# Patient Record
Sex: FEMALE | Race: WHITE | HISPANIC OR LATINO | URBAN - METROPOLITAN AREA
[De-identification: names, ages, dates, MRNs, and addresses within clinical notes are randomized per-mention and may not be internally consistent; named-entity substitution may affect disease eponyms.]

---

## 2017-12-28 ENCOUNTER — EMERGENCY (EMERGENCY)
Facility: HOSPITAL | Age: 37
LOS: 1 days | Discharge: HOME | End: 2017-12-28
Attending: EMERGENCY MEDICINE | Admitting: EMERGENCY MEDICINE
Payer: MEDICAID

## 2017-12-28 VITALS
HEIGHT: 63 IN | WEIGHT: 149.91 LBS | TEMPERATURE: 98 F | OXYGEN SATURATION: 99 % | RESPIRATION RATE: 20 BRPM | DIASTOLIC BLOOD PRESSURE: 84 MMHG | HEART RATE: 78 BPM | SYSTOLIC BLOOD PRESSURE: 129 MMHG

## 2017-12-28 VITALS
HEART RATE: 62 BPM | OXYGEN SATURATION: 99 % | DIASTOLIC BLOOD PRESSURE: 96 MMHG | TEMPERATURE: 97 F | SYSTOLIC BLOOD PRESSURE: 141 MMHG | RESPIRATION RATE: 18 BRPM

## 2017-12-28 LAB
ANION GAP SERPL CALC-SCNC: 10 MMOL/L — SIGNIFICANT CHANGE UP (ref 5–17)
APPEARANCE UR: CLEAR — SIGNIFICANT CHANGE UP
BACTERIA # UR AUTO: ABNORMAL
BASOPHILS # BLD AUTO: 0 K/UL — SIGNIFICANT CHANGE UP (ref 0–0.2)
BASOPHILS NFR BLD AUTO: 0.2 % — SIGNIFICANT CHANGE UP (ref 0–2)
BILIRUB UR-MCNC: NEGATIVE — SIGNIFICANT CHANGE UP
BUN SERPL-MCNC: 11 MG/DL — SIGNIFICANT CHANGE UP (ref 8–20)
CALCIUM SERPL-MCNC: 8.8 MG/DL — SIGNIFICANT CHANGE UP (ref 8.6–10.2)
CHLORIDE SERPL-SCNC: 102 MMOL/L — SIGNIFICANT CHANGE UP (ref 98–107)
CO2 SERPL-SCNC: 24 MMOL/L — SIGNIFICANT CHANGE UP (ref 22–29)
COLOR SPEC: YELLOW — SIGNIFICANT CHANGE UP
CREAT SERPL-MCNC: 0.49 MG/DL — LOW (ref 0.5–1.3)
DIFF PNL FLD: ABNORMAL
EOSINOPHIL # BLD AUTO: 0.1 K/UL — SIGNIFICANT CHANGE UP (ref 0–0.5)
EOSINOPHIL NFR BLD AUTO: 2.5 % — SIGNIFICANT CHANGE UP (ref 0–6)
EPI CELLS # UR: SIGNIFICANT CHANGE UP
GLUCOSE SERPL-MCNC: 101 MG/DL — SIGNIFICANT CHANGE UP (ref 70–115)
GLUCOSE UR QL: NEGATIVE MG/DL — SIGNIFICANT CHANGE UP
HCG SERPL-ACNC: <5 MIU/ML — SIGNIFICANT CHANGE UP
HCT VFR BLD CALC: 40.3 % — SIGNIFICANT CHANGE UP (ref 37–47)
HGB BLD-MCNC: 13.6 G/DL — SIGNIFICANT CHANGE UP (ref 12–16)
KETONES UR-MCNC: NEGATIVE — SIGNIFICANT CHANGE UP
LEUKOCYTE ESTERASE UR-ACNC: ABNORMAL
LYMPHOCYTES # BLD AUTO: 2 K/UL — SIGNIFICANT CHANGE UP (ref 1–4.8)
LYMPHOCYTES # BLD AUTO: 36.8 % — SIGNIFICANT CHANGE UP (ref 20–55)
MCHC RBC-ENTMCNC: 31.4 PG — HIGH (ref 27–31)
MCHC RBC-ENTMCNC: 33.7 G/DL — SIGNIFICANT CHANGE UP (ref 32–36)
MCV RBC AUTO: 93.1 FL — SIGNIFICANT CHANGE UP (ref 81–99)
MONOCYTES # BLD AUTO: 0.5 K/UL — SIGNIFICANT CHANGE UP (ref 0–0.8)
MONOCYTES NFR BLD AUTO: 9.6 % — SIGNIFICANT CHANGE UP (ref 3–10)
NEUTROPHILS # BLD AUTO: 2.8 K/UL — SIGNIFICANT CHANGE UP (ref 1.8–8)
NEUTROPHILS NFR BLD AUTO: 50.7 % — SIGNIFICANT CHANGE UP (ref 37–73)
NITRITE UR-MCNC: NEGATIVE — SIGNIFICANT CHANGE UP
PH UR: 6 — SIGNIFICANT CHANGE UP (ref 5–8)
PLATELET # BLD AUTO: 219 K/UL — SIGNIFICANT CHANGE UP (ref 150–400)
POTASSIUM SERPL-MCNC: 4.3 MMOL/L — SIGNIFICANT CHANGE UP (ref 3.5–5.3)
POTASSIUM SERPL-SCNC: 4.3 MMOL/L — SIGNIFICANT CHANGE UP (ref 3.5–5.3)
PROT UR-MCNC: NEGATIVE MG/DL — SIGNIFICANT CHANGE UP
RBC # BLD: 4.33 M/UL — LOW (ref 4.4–5.2)
RBC # FLD: 12 % — SIGNIFICANT CHANGE UP (ref 11–15.6)
RBC CASTS # UR COMP ASSIST: ABNORMAL /HPF (ref 0–4)
SODIUM SERPL-SCNC: 136 MMOL/L — SIGNIFICANT CHANGE UP (ref 135–145)
SP GR SPEC: 1.01 — SIGNIFICANT CHANGE UP (ref 1.01–1.02)
UROBILINOGEN FLD QL: NEGATIVE MG/DL — SIGNIFICANT CHANGE UP
WBC # BLD: 5.5 K/UL — SIGNIFICANT CHANGE UP (ref 4.8–10.8)
WBC # FLD AUTO: 5.5 K/UL — SIGNIFICANT CHANGE UP (ref 4.8–10.8)
WBC UR QL: SIGNIFICANT CHANGE UP

## 2017-12-28 PROCEDURE — 99284 EMERGENCY DEPT VISIT MOD MDM: CPT

## 2017-12-28 RX ORDER — MECLIZINE HCL 12.5 MG
25 TABLET ORAL ONCE
Refills: 0 | Status: DISCONTINUED | OUTPATIENT
Start: 2017-12-28 | End: 2017-12-28

## 2017-12-28 RX ORDER — SODIUM CHLORIDE 9 MG/ML
1000 INJECTION INTRAMUSCULAR; INTRAVENOUS; SUBCUTANEOUS ONCE
Refills: 0 | Status: COMPLETED | OUTPATIENT
Start: 2017-12-28 | End: 2017-12-28

## 2017-12-28 RX ORDER — MOMETASONE FUROATE 50 UG/1
2 SPRAY NASAL
Qty: 1 | Refills: 0
Start: 2017-12-28 | End: 2018-01-26

## 2017-12-28 RX ORDER — ONDANSETRON 8 MG/1
4 TABLET, FILM COATED ORAL ONCE
Refills: 0 | Status: COMPLETED | OUTPATIENT
Start: 2017-12-28 | End: 2017-12-28

## 2017-12-28 RX ORDER — MECLIZINE HCL 12.5 MG
25 TABLET ORAL ONCE
Refills: 0 | Status: COMPLETED | OUTPATIENT
Start: 2017-12-28 | End: 2017-12-28

## 2017-12-28 RX ORDER — CETIRIZINE HYDROCHLORIDE, PSEUDOEPHEDRINE HYDROCHLORIDE 5; 120 MG/1; MG/1
1 TABLET, FILM COATED, EXTENDED RELEASE ORAL
Qty: 7 | Refills: 0
Start: 2017-12-28 | End: 2018-01-03

## 2017-12-28 RX ORDER — AZITHROMYCIN 500 MG/1
1 TABLET, FILM COATED ORAL
Qty: 4 | Refills: 0
Start: 2017-12-28 | End: 2017-12-31

## 2017-12-28 RX ADMIN — SODIUM CHLORIDE 1000 MILLILITER(S): 9 INJECTION INTRAMUSCULAR; INTRAVENOUS; SUBCUTANEOUS at 11:31

## 2017-12-28 RX ADMIN — Medication 25 MILLIGRAM(S): at 11:31

## 2017-12-28 RX ADMIN — ONDANSETRON 4 MILLIGRAM(S): 8 TABLET, FILM COATED ORAL at 11:31

## 2017-12-28 NOTE — ED STATDOCS - ATTENDING CONTRIBUTION TO CARE
I, Ayala Garvey, performed the initial face to face bedside interview with this patient regarding history of present illness, review of symptoms and relevant past medical, social and family history.  I completed an independent physical examination.  I was the initial provider who evaluated this patient. I have signed out the follow up of any pending tests (i.e. labs, radiological studies) to the ACP.  I have communicated the patient’s plan of care and disposition with the ACP.

## 2017-12-28 NOTE — ED STATDOCS - SECONDARY DIAGNOSIS.
Acute serous otitis media of left ear, recurrence not specified Other nonsuppurative otitis media of right ear, unspecified chronicity

## 2017-12-28 NOTE — ED STATDOCS - OBJECTIVE STATEMENT
36 y/o F pt presents to ED c/o dizziness worsening since last night. Dizziness waxing and waning for 2 months. Pt states she felt her bed and floor moving. +nausea, chronic neck and back pain. Pain rated 4/10. Recent sinus infection. Smoker. Denies sinus congestion. No further complaints at this time.

## 2017-12-28 NOTE — ED ADULT TRIAGE NOTE - CHIEF COMPLAINT QUOTE
Pt states she woke up in bed last night and felt like the bed was moving   This morning she is feeling dizziness when she walks and nausea.

## 2017-12-28 NOTE — ED STATDOCS - MEDICAL DECISION MAKING DETAILS
pt with vertigo will hydrate, give Zofran, meclizine, and D/C pt with vertigo and serous otitis media. will hydrate, give Zofran, meclizine, and D/C

## 2017-12-28 NOTE — ED STATDOCS - PROGRESS NOTE DETAILS
patient re-evaluated, feels better, c/o dizziness intermittently x 2 months, comes and goes, last night worsened, reports is under a lot of stress, denies any suicide idealations or wanting to harm others, PE: general: patient crying, ambulating with no gait abnormalities, chest CTAB, heart s1s2, abd soft NT ND, Ear: R TM erythematous, L TM with fluid, Impression: dizziness with vertigo and ROM/serous LOM. Will RX Nasal spray and decongestant, to f/u with ENT/neuro as outpatient, offered CT of head, patient prefers to f/u as outpatient and offered SW/case management to discuss stressors in life (patient crying at bedside) and refused. offered CT and patient refused

## 2017-12-28 NOTE — ED STATDOCS - CARE PLAN
Principal Discharge DX:	Vertigo  Secondary Diagnosis:	Acute serous otitis media of left ear, recurrence not specified  Secondary Diagnosis:	Other nonsuppurative otitis media of right ear, unspecified chronicity

## 2018-04-02 ENCOUNTER — APPOINTMENT (OUTPATIENT)
Dept: OBGYN | Facility: CLINIC | Age: 38
End: 2018-04-02

## 2021-03-19 ENCOUNTER — APPOINTMENT (OUTPATIENT)
Dept: OBGYN | Facility: CLINIC | Age: 41
End: 2021-03-19

## 2021-03-25 ENCOUNTER — TRANSCRIPTION ENCOUNTER (OUTPATIENT)
Age: 41
End: 2021-03-25

## 2021-05-27 ENCOUNTER — EMERGENCY (EMERGENCY)
Facility: HOSPITAL | Age: 41
LOS: 1 days | Discharge: HOME | End: 2021-05-27
Attending: EMERGENCY MEDICINE
Payer: COMMERCIAL

## 2021-05-27 VITALS
OXYGEN SATURATION: 98 % | WEIGHT: 149.91 LBS | RESPIRATION RATE: 18 BRPM | HEIGHT: 63 IN | HEART RATE: 83 BPM | SYSTOLIC BLOOD PRESSURE: 120 MMHG | TEMPERATURE: 98 F | DIASTOLIC BLOOD PRESSURE: 77 MMHG

## 2021-05-27 LAB
ALBUMIN SERPL ELPH-MCNC: 4 G/DL — SIGNIFICANT CHANGE UP (ref 3.3–5.2)
ALP SERPL-CCNC: 89 U/L — SIGNIFICANT CHANGE UP (ref 40–120)
ALT FLD-CCNC: 18 U/L — SIGNIFICANT CHANGE UP
ANION GAP SERPL CALC-SCNC: 11 MMOL/L — SIGNIFICANT CHANGE UP (ref 5–17)
AST SERPL-CCNC: 24 U/L — SIGNIFICANT CHANGE UP
BASOPHILS # BLD AUTO: 0.03 K/UL — SIGNIFICANT CHANGE UP (ref 0–0.2)
BASOPHILS NFR BLD AUTO: 0.4 % — SIGNIFICANT CHANGE UP (ref 0–2)
BILIRUB SERPL-MCNC: 0.3 MG/DL — LOW (ref 0.4–2)
BUN SERPL-MCNC: 10.7 MG/DL — SIGNIFICANT CHANGE UP (ref 8–20)
CALCIUM SERPL-MCNC: 9 MG/DL — SIGNIFICANT CHANGE UP (ref 8.6–10.2)
CHLORIDE SERPL-SCNC: 106 MMOL/L — SIGNIFICANT CHANGE UP (ref 98–107)
CO2 SERPL-SCNC: 25 MMOL/L — SIGNIFICANT CHANGE UP (ref 22–29)
CREAT SERPL-MCNC: 0.54 MG/DL — SIGNIFICANT CHANGE UP (ref 0.5–1.3)
EOSINOPHIL # BLD AUTO: 0.21 K/UL — SIGNIFICANT CHANGE UP (ref 0–0.5)
EOSINOPHIL NFR BLD AUTO: 2.8 % — SIGNIFICANT CHANGE UP (ref 0–6)
GLUCOSE SERPL-MCNC: 98 MG/DL — SIGNIFICANT CHANGE UP (ref 70–99)
HCG SERPL-ACNC: <4 MIU/ML — SIGNIFICANT CHANGE UP
HCT VFR BLD CALC: 41.1 % — SIGNIFICANT CHANGE UP (ref 34.5–45)
HGB BLD-MCNC: 13.7 G/DL — SIGNIFICANT CHANGE UP (ref 11.5–15.5)
IMM GRANULOCYTES NFR BLD AUTO: 0.3 % — SIGNIFICANT CHANGE UP (ref 0–1.5)
LYMPHOCYTES # BLD AUTO: 1.89 K/UL — SIGNIFICANT CHANGE UP (ref 1–3.3)
LYMPHOCYTES # BLD AUTO: 25.4 % — SIGNIFICANT CHANGE UP (ref 13–44)
MCHC RBC-ENTMCNC: 31.7 PG — SIGNIFICANT CHANGE UP (ref 27–34)
MCHC RBC-ENTMCNC: 33.3 GM/DL — SIGNIFICANT CHANGE UP (ref 32–36)
MCV RBC AUTO: 95.1 FL — SIGNIFICANT CHANGE UP (ref 80–100)
MONOCYTES # BLD AUTO: 0.72 K/UL — SIGNIFICANT CHANGE UP (ref 0–0.9)
MONOCYTES NFR BLD AUTO: 9.7 % — SIGNIFICANT CHANGE UP (ref 2–14)
NEUTROPHILS # BLD AUTO: 4.58 K/UL — SIGNIFICANT CHANGE UP (ref 1.8–7.4)
NEUTROPHILS NFR BLD AUTO: 61.4 % — SIGNIFICANT CHANGE UP (ref 43–77)
PLATELET # BLD AUTO: 257 K/UL — SIGNIFICANT CHANGE UP (ref 150–400)
POTASSIUM SERPL-MCNC: 4.7 MMOL/L — SIGNIFICANT CHANGE UP (ref 3.5–5.3)
POTASSIUM SERPL-SCNC: 4.7 MMOL/L — SIGNIFICANT CHANGE UP (ref 3.5–5.3)
PROT SERPL-MCNC: 7.2 G/DL — SIGNIFICANT CHANGE UP (ref 6.6–8.7)
RBC # BLD: 4.32 M/UL — SIGNIFICANT CHANGE UP (ref 3.8–5.2)
RBC # FLD: 12.3 % — SIGNIFICANT CHANGE UP (ref 10.3–14.5)
SODIUM SERPL-SCNC: 142 MMOL/L — SIGNIFICANT CHANGE UP (ref 135–145)
TROPONIN T SERPL-MCNC: <0.01 NG/ML — SIGNIFICANT CHANGE UP (ref 0–0.06)
WBC # BLD: 7.45 K/UL — SIGNIFICANT CHANGE UP (ref 3.8–10.5)
WBC # FLD AUTO: 7.45 K/UL — SIGNIFICANT CHANGE UP (ref 3.8–10.5)

## 2021-05-27 PROCEDURE — 99285 EMERGENCY DEPT VISIT HI MDM: CPT

## 2021-05-27 PROCEDURE — 71045 X-RAY EXAM CHEST 1 VIEW: CPT | Mod: 26

## 2021-05-27 PROCEDURE — 93010 ELECTROCARDIOGRAM REPORT: CPT

## 2021-05-27 RX ORDER — ACETAMINOPHEN 500 MG
650 TABLET ORAL ONCE
Refills: 0 | Status: COMPLETED | OUTPATIENT
Start: 2021-05-27 | End: 2021-05-27

## 2021-05-27 RX ADMIN — Medication 650 MILLIGRAM(S): at 16:50

## 2021-05-27 NOTE — ED STATDOCS - CLINICAL SUMMARY MEDICAL DECISION MAKING FREE TEXT BOX
Pt with epigastric/bilateral rib pain worse in the past few days. Vitals stable. Exam is normal. Pain appears to be musculoskeletal in nature. Will check labs, CXR, and reevaluate. - - -

## 2021-05-27 NOTE — ED STATDOCS - ATTENDING CONTRIBUTION TO CARE
I, Dr. Landa, performed the initial face to face bedside interview with this patient regarding history of present illness, review of symptoms and relevant past medical, social and family history.  I completed an independent physical examination.  I was the initial provider who evaluated this patient. I have signed out the follow up of any pending tests (i.e. labs, radiological studies) to the ACP.  I have communicated the patient’s plan of care and disposition with the ACP.

## 2021-05-27 NOTE — ED STATDOCS - GASTROINTESTINAL, MLM
abdomen soft, mild epigastric tenderness, mild bilateral rib tenderness, and non-distended. No RUQ tenderness. Bowel sounds present.

## 2021-05-27 NOTE — ED STATDOCS - OBJECTIVE STATEMENT
42 y/o F pt with no PMHx presents to the ED c/o upper abd pain for several days but became worse yesterday. Exacerbated by movement and taking a deep breath. Not affected by food intake. She feels fine when she lies down without moving. 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 Denies heavy lifting and denies trauma. Reports a mild HA as well. Has not taken meds PTA. PSHx of tummy tuck. Denies dysuria and back pain. Not on meds on a daily basis. Allergic to ASA.

## 2021-05-27 NOTE — ED STATDOCS - PATIENT PORTAL LINK FT
You can access the FollowMyHealth Patient Portal offered by [facility] by registering at the following website: [email or web address]. By joining [employer]’s FollowMyHealth portal, you will also be able to view your health information using other applications (apps) compatible with our system.

## 2021-05-27 NOTE — ED ADULT TRIAGE NOTE - CHIEF COMPLAINT QUOTE
ambulatory into ED a&ox3, no acute distress, breaths even and unlabored c/o epigastric pain x few days. denies N/v/d/fever.

## 2021-12-13 ENCOUNTER — EMERGENCY (EMERGENCY)
Facility: HOSPITAL | Age: 41
LOS: 1 days | Discharge: HOME | End: 2021-12-13
Attending: EMERGENCY MEDICINE
Payer: COMMERCIAL

## 2021-12-13 VITALS
TEMPERATURE: 98 F | OXYGEN SATURATION: 98 % | DIASTOLIC BLOOD PRESSURE: 86 MMHG | SYSTOLIC BLOOD PRESSURE: 136 MMHG | HEIGHT: 63 IN | RESPIRATION RATE: 18 BRPM | HEART RATE: 89 BPM

## 2021-12-13 PROCEDURE — 99283 EMERGENCY DEPT VISIT LOW MDM: CPT | Mod: 25

## 2021-12-13 PROCEDURE — 71046 X-RAY EXAM CHEST 2 VIEWS: CPT

## 2021-12-13 PROCEDURE — 71046 X-RAY EXAM CHEST 2 VIEWS: CPT | Mod: 26

## 2021-12-13 PROCEDURE — 94640 AIRWAY INHALATION TREATMENT: CPT

## 2021-12-13 PROCEDURE — 99284 EMERGENCY DEPT VISIT MOD MDM: CPT

## 2021-12-13 PROCEDURE — 93005 ELECTROCARDIOGRAM TRACING: CPT

## 2021-12-13 PROCEDURE — 93010 ELECTROCARDIOGRAM REPORT: CPT

## 2021-12-13 RX ORDER — IBUPROFEN 200 MG
1 TABLET ORAL
Qty: 20 | Refills: 0
Start: 2021-12-13 | End: 2021-12-17

## 2021-12-13 RX ORDER — ALBUTEROL 90 UG/1
2 AEROSOL, METERED ORAL EVERY 6 HOURS
Refills: 0 | Status: DISCONTINUED | OUTPATIENT
Start: 2021-12-13 | End: 2021-12-18

## 2021-12-13 RX ADMIN — Medication 60 MILLIGRAM(S): at 23:55

## 2021-12-13 RX ADMIN — ALBUTEROL 2 PUFF(S): 90 AEROSOL, METERED ORAL at 23:54

## 2021-12-13 NOTE — ED STATDOCS - ADDITIONAL NOTES AND INSTRUCTIONS:
PT was evaluated At [facility] ED and was found to have a condition that warranted time of to rest and heal from WORK/SCHOOL.   Alexis Robles PA-C

## 2021-12-13 NOTE — ED STATDOCS - CLINICAL SUMMARY MEDICAL DECISION MAKING FREE TEXT BOX
PT w/ pleuritic chest pain and back pain and right sided rib pain x2 days. PT admits pain is worse w/ cough and movement. Suspecting viral syndrome. Will check CXR to rule out pneumonia. PT nontoxic appearing.

## 2021-12-13 NOTE — ED STATDOCS - PROGRESS NOTE DETAILS
Alexis Rowan: Pt seen by intake physician and HPI/ROS/PE/plan reviewed Confirmed and adjusted were appropriate.    PE: GEN: Awake, alert,  NAD,  EYES: PERRL CARDIAC: Reg rate and rhythm, S1,S2, RRR  RESP: No distress noted. Lungs CTA bilaterally no wheeze, ronchi, rales. ABD: soft,  non-tender, no guarding. . NEURO: AOx3, no focal deficits   PLAN: Pt with stable VS, tolerating PO in the ed making urine Pt has moist mucus membranes,  non toxic appearing interacting with staff and family appropriately, Pt with no s/s of local or systemic bacterial infection, symptoms likely viral in nature, PT will be dc home with follow up to PCP, good supportive care, educated Pt about proper use of antipyretics, good hydrations, educated about when to return to the ED if needed. PT verbalizes that he understands all instructions and results. Pt informed that ED is open and available 24/7 365 days a yr, encouraged to return to the ED if they have any change in condition, or feel the need for revaluation.

## 2021-12-13 NOTE — ED ADULT NURSE NOTE - OBJECTIVE STATEMENT
PT c/o dry cough and body aches x 4 days with no relief. Pt denies sick exposure. Pt states chest hurts from coughing.

## 2021-12-13 NOTE — ED ADULT TRIAGE NOTE - CHIEF COMPLAINT QUOTE
cough, sinus pressure, ear pressure, pain with breathing. had rashad and rashad shot 2 weeks ago and hasn't felt well since.

## 2021-12-13 NOTE — ED STATDOCS - OBJECTIVE STATEMENT
42 Y/O female w/ PMHx. of seasonal asthma presents to ED s/p COVID-19 vaccine x2 weeks now c/o sinus pressure in ears and HA x2 weeks. PT also admits to x2 days of right-sided chest and rib pain with deep inhalation, worsening w/ movement. PT took Mucinex w/ minimal relief. PT denies fever, chills, SOB, urinary symptoms.

## 2021-12-13 NOTE — ED STATDOCS - ENMT, MLM
(+) nasal congestion. Mouth with normal mucosa.  Throat has no vesicles, no oropharyngeal exudates and uvula is midline.

## 2021-12-13 NOTE — ED STATDOCS - PATIENT PORTAL LINK FT
You can access the FollowMyHealth Patient Portal offered by [facility] by registering at the following website: [email or web address]. By joining [employer]’s FollowMyHealth portal, you will also be able to view your health information using other applications (apps) compatible with our system.

## 2021-12-13 NOTE — ED STATDOCS - ENMT NEGATIVE STATEMENT, MLM
Ears: (+) ear pain. no hearing problems. Nose: no nasal congestion and no nasal drainage. Mouth/Throat: no dysphagia, no hoarseness and no throat pain. Neck: no lumps, no pain, no stiffness and no swollen glands.

## 2021-12-13 NOTE — ED STATDOCS - MUSCULOSKELETAL, MLM
range of motion is not limited. (+) reproducible chest wall TTP. (+) Slight TTP to maxillary and nasal sinuses.

## 2021-12-30 ENCOUNTER — EMERGENCY (EMERGENCY)
Facility: HOSPITAL | Age: 41
LOS: 1 days | Discharge: HOME | End: 2021-12-30
Attending: EMERGENCY MEDICINE
Payer: COMMERCIAL

## 2021-12-30 VITALS
SYSTOLIC BLOOD PRESSURE: 132 MMHG | WEIGHT: 149.91 LBS | TEMPERATURE: 98 F | OXYGEN SATURATION: 98 % | DIASTOLIC BLOOD PRESSURE: 90 MMHG | HEART RATE: 66 BPM | RESPIRATION RATE: 16 BRPM

## 2021-12-30 PROCEDURE — 99283 EMERGENCY DEPT VISIT LOW MDM: CPT

## 2021-12-30 PROCEDURE — 99282 EMERGENCY DEPT VISIT SF MDM: CPT

## 2021-12-30 NOTE — ED PROVIDER NOTE - ATTENDING CONTRIBUTION TO CARE
Rina: I performed a face to face bedside interview with patient regarding history of present illness, review of symptoms and past medical history. I completed an independent physical exam and ordered tests/medications as needed.  I have discussed patient's plan of care with the resident. The resident assisted in  executing the discussed plan. I was available for any questions or issues that may have arose during the execution of the plan of care.

## 2021-12-30 NOTE — ED PROVIDER NOTE - PROGRESS NOTE DETAILS
exam: Nurse chaperone. Bimanual exam no foreign body, no CMT, normal discharge. Speculum exam no foreign body.

## 2021-12-30 NOTE — ED PROVIDER NOTE - OBJECTIVE STATEMENT
40 y/o female with no PMHx presents to the ED stating she believes she lost a tampon inside her vagina 4 days ago. Pt states she believes she can feel it, but cannot find the string to pull it out. Pt states she does not remember if she removed her tampon. Pt endorses mild vaginal discharge. Pt denies fevers, vaginal bleeding.

## 2021-12-30 NOTE — ED PROVIDER NOTE - NSICDXPASTMEDICALHX_GEN_ALL_CORE_FT
PAST MEDICAL HISTORY:  No pertinent past medical history
Implemented All Universal Safety Interventions:  [place] to call system. Call bell, personal items and telephone within reach. Instruct patient to call for assistance. Room bathroom lighting operational. Non-slip footwear when patient is off stretcher. Physically safe environment: no spills, clutter or unnecessary equipment. Stretcher in lowest position, wheels locked, appropriate side rails in place.

## 2021-12-30 NOTE — ED PROVIDER NOTE - PATIENT PORTAL LINK FT
You can access the FollowMyHealth Patient Portal offered by [facility] by registering at the following website: [email or web address]. By joining [employer]’s FollowMyHealth portal, you will also be able to view your health information using other applications (apps) compatible with our system.

## 2021-12-30 NOTE — ED PROVIDER NOTE - CLINICAL SUMMARY MEDICAL DECISION MAKING FREE TEXT BOX
Patient presenting with concern for foreign body in vagina. None found. Patient has no GYN. Will provide f./u.

## 2021-12-30 NOTE — ED ADULT TRIAGE NOTE - CHIEF COMPLAINT QUOTE
Ambulatory reporting being unable to remove her tampon since 12/25. Patient states that it is "too deep and she cant get it." Afebrile. Denies pain.

## 2021-12-30 NOTE — ED PROVIDER NOTE - PHYSICAL EXAMINATION
Gen: Well appearing in NAD  Head: NC/AT  Neck: trachea midline  Resp:  No distress  :  performed by Dr. Rolle   Ext: no deformities  Neuro:  A&O appears non focal  Skin:  Warm and dry as visualized  Psych:  Normal affect and mood

## 2022-06-13 ENCOUNTER — EMERGENCY (EMERGENCY)
Facility: HOSPITAL | Age: 42
LOS: 1 days | Discharge: HOME | End: 2022-06-13
Attending: EMERGENCY MEDICINE
Payer: COMMERCIAL

## 2022-06-13 VITALS
DIASTOLIC BLOOD PRESSURE: 97 MMHG | SYSTOLIC BLOOD PRESSURE: 147 MMHG | RESPIRATION RATE: 16 BRPM | HEART RATE: 77 BPM | HEIGHT: 63 IN | WEIGHT: 139.99 LBS | OXYGEN SATURATION: 99 %

## 2022-06-13 PROBLEM — Z78.9 OTHER SPECIFIED HEALTH STATUS: Chronic | Status: ACTIVE | Noted: 2021-12-30

## 2022-06-13 PROCEDURE — 73130 X-RAY EXAM OF HAND: CPT

## 2022-06-13 PROCEDURE — 73130 X-RAY EXAM OF HAND: CPT | Mod: 26,LT

## 2022-06-13 PROCEDURE — 99283 EMERGENCY DEPT VISIT LOW MDM: CPT | Mod: 25

## 2022-06-13 PROCEDURE — 73110 X-RAY EXAM OF WRIST: CPT | Mod: 26,LT

## 2022-06-13 PROCEDURE — 73110 X-RAY EXAM OF WRIST: CPT

## 2022-06-13 PROCEDURE — 99283 EMERGENCY DEPT VISIT LOW MDM: CPT

## 2022-06-13 RX ORDER — IBUPROFEN 200 MG
600 TABLET ORAL ONCE
Refills: 0 | Status: COMPLETED | OUTPATIENT
Start: 2022-06-13 | End: 2022-06-13

## 2022-06-13 RX ADMIN — Medication 600 MILLIGRAM(S): at 01:18

## 2022-06-13 NOTE — ED ADULT NURSE NOTE - OBJECTIVE STATEMENT
Assumed care of pt at 0125 in ST. Pt A&Ox4 c/o hand pain x2 months. Pt denies recent injury to area. Pt currently texting on phone, able to move BUE w/o difficulty.

## 2022-06-13 NOTE — ED PROVIDER NOTE - CLINICAL SUMMARY MEDICAL DECISION MAKING FREE TEXT BOX
42 year old female with no med hx presents to ED c/o hand pain x many months.   - xray, tylenol, brace

## 2022-06-13 NOTE — ED ADULT TRIAGE NOTE - CHIEF COMPLAINT QUOTE
C/O left hand pain for the past few months. Pain increased this week.  Having difficulty lifting things and opening containers. Denies injury.

## 2022-06-13 NOTE — ED PROVIDER NOTE - ATTENDING APP SHARED VISIT CONTRIBUTION OF CARE
well appearing, NAD; b/l upper ext neurovasc intact; no deformities, no swelling, no overlying skin changes.  Agree with acp plan of care  This was a shared visit with LYNN. I reviewed and verified the documentation and independently performed the documented history/exam/mdm.

## 2022-06-13 NOTE — ED ADULT NURSE NOTE - TEMPLATE
Chief Complaint   Patient presents with     Consult     New otitis media Mother states no pain today.        N Ramirez BENNETTN    
Orthopedic

## 2022-06-13 NOTE — ED PROVIDER NOTE - OBJECTIVE STATEMENT
42 year old female with no med hx presents to ED c/o hand pain x many months. pt states she works as a . is ambidextrous. uses both hands constantly at work. she has not pain medicating at home. she denies numbness, tingling, trauma, injury. denies decrease in ROM. denies fever/chills, redness/swelling.

## 2022-06-13 NOTE — ED PROVIDER NOTE - NSFOLLOWUPINSTRUCTIONS_ED_ALL_ED_FT
Follow up with hand doctor within 1 week   take tylenol every 6 hours for pain   keep elevated   avoid overuse   keep brace in place     return if new or worsening symptoms     Sprain    A sprain is a stretch or tear in one of the tough, fiber-like tissues (ligaments) in your body. This is caused by an injury to the area such as a twisting mechanism. Symptoms include pain, swelling, or bruising. Rest that area over the next several days and slowly resume activity when tolerated. Ice can help with swelling and pain.     SEEK IMMEDIATE MEDICAL CARE IF YOU HAVE ANY OF THE FOLLOWING SYMPTOMS: worsening pain, inability to move that body part, numbness or tingling.

## 2022-06-13 NOTE — ED PROVIDER NOTE - CARE PROVIDER_API CALL
Keiry Cole)  Plastic Surgery; Surgery of the Hand  [address], [address]  [place] [address]  Phone: [phone]  Fax: [phone]  Follow Up Time:

## 2022-06-13 NOTE — ED PROVIDER NOTE - PATIENT PORTAL LINK FT
You can access the FollowMyHealth Patient Portal offered by [facility] by registering at the following website: [email or web address]. By joining [employer]’s FollowMyHealth portal, you will also be able to view your health information using other applications (apps) compatible with our system.

## 2023-07-24 ENCOUNTER — EMERGENCY (EMERGENCY)
Facility: HOSPITAL | Age: 43
LOS: 1 days | Discharge: HOME | End: 2023-07-24
Attending: EMERGENCY MEDICINE
Payer: COMMERCIAL

## 2023-07-24 VITALS
SYSTOLIC BLOOD PRESSURE: 151 MMHG | RESPIRATION RATE: 18 BRPM | DIASTOLIC BLOOD PRESSURE: 93 MMHG | HEART RATE: 73 BPM | HEIGHT: 62 IN | OXYGEN SATURATION: 100 % | TEMPERATURE: 98 F | WEIGHT: 145.51 LBS

## 2023-07-24 PROCEDURE — 99283 EMERGENCY DEPT VISIT LOW MDM: CPT

## 2023-07-24 RX ORDER — CICLOPIROX OLAMINE 7.7 MG/G
1 CREAM TOPICAL
Qty: 1 | Refills: 0
Start: 2023-07-24 | End: 2023-07-30

## 2023-07-24 NOTE — ED PROVIDER NOTE - PATIENT PORTAL LINK FT
You can access the FollowMyHealth Patient Portal offered by [facility] by registering at the following website: [email or web address]. By joining [employer]’s FollowMyHealth portal, you will also be able to view your health information using other applications (apps) compatible with our system.

## 2023-07-24 NOTE — ED ADULT TRIAGE NOTE - CHIEF COMPLAINT QUOTE
pt states she had a pedicure & 3 days later both great toes look like I have an infection at base of the nail  A&OX3 resp wnl

## 2023-07-24 NOTE — ED ADULT TRIAGE NOTE - HISTORY OF COVID-19 VACCINATION
Problem:  Activity:  Goal: Ability to tolerate increased activity will improve  Description: Ability to tolerate increased activity will improve  8/3/2021 0559 by Kimberly Russo RN  Outcome: Ongoing  8/2/2021 1602 by Jr Salgado RN  Outcome: Ongoing  Goal: Expression of feelings of increased energy will increase  Description: Expression of feelings of increased energy will increase  8/3/2021 0559 by Kimberly Russo RN  Outcome: Ongoing  8/2/2021 1602 by Jr Salgado RN  Outcome: Ongoing     Problem: Cardiac:  Goal: Ability to maintain an adequate cardiac output will improve  Description: Ability to maintain an adequate cardiac output will improve  8/3/2021 0559 by Kimberly Russo RN  Outcome: Ongoing  8/2/2021 1602 by Jr Salgado RN  Outcome: Ongoing  Goal: Complications related to the disease process, condition or treatment will be avoided or minimized  Description: Complications related to the disease process, condition or treatment will be avoided or minimized  8/3/2021 0559 by Kimberly Russo RN  Outcome: Ongoing  8/2/2021 1602 by Jr Salgado RN  Outcome: Ongoing     Problem: Coping:  Goal: Level of anxiety will decrease  Description: Level of anxiety will decrease  8/3/2021 0559 by Kimberly Russo RN  Outcome: Ongoing  8/2/2021 1602 by Jr Salgado RN  Outcome: Ongoing  Goal: General experience of comfort will improve  Description: General experience of comfort will improve  8/3/2021 0559 by Kimberly Russo RN  Outcome: Ongoing  8/2/2021 1602 by Jr Salgado RN  Outcome: Ongoing     Problem: Health Behavior:  Goal: Ability to manage health-related needs will improve  Description: Ability to manage health-related needs will improve  8/3/2021 0559 by Kimberly Russo RN  Outcome: Ongoing  8/2/2021 1602 by Jr Salgado RN  Outcome: Ongoing     Problem: Safety:  Goal: Ability to remain free from injury will improve  Description: Ability to remain free from injury will improve  8/3/2021 0559 by Christiano Lockwood RN  Outcome: Ongoing  8/2/2021 1602 by Dennys Schuler RN  Outcome: Ongoing  Goal: Will show no signs and symptoms of excessive bleeding  Description: Will show no signs and symptoms of excessive bleeding  8/3/2021 0559 by Christiano Lockwood RN  Outcome: Ongoing  8/2/2021 1602 by Dennys Schuler RN  Outcome: Ongoing     Problem: Falls - Risk of:  Goal: Will remain free from falls  Description: Will remain free from falls  8/3/2021 0559 by Christiano Lockwood RN  Outcome: Ongoing  8/2/2021 1602 by Dennys Schuler RN  Outcome: Ongoing  Goal: Absence of physical injury  Description: Absence of physical injury  8/3/2021 0559 by Christiano Lockwood RN  Outcome: Ongoing  8/2/2021 1602 by Dennys Schuler RN  Outcome: Ongoing     Problem: Skin Integrity:  Goal: Will show no infection signs and symptoms  Description: Will show no infection signs and symptoms  8/3/2021 0559 by Christiano Lockwood RN  Outcome: Ongoing  8/2/2021 1602 by Dennys Schuler RN  Outcome: Ongoing  Goal: Absence of new skin breakdown  Description: Absence of new skin breakdown  8/3/2021 0559 by Christiano Lockwood RN  Outcome: Ongoing  8/2/2021 1602 by Dennys Schuler RN  Outcome: Ongoing     Problem: Musculor/Skeletal Functional Status  Goal: Highest potential functional level  8/3/2021 0559 by Christiano Lockwood RN  Outcome: Ongoing  8/2/2021 1602 by Dennys Schuler RN  Outcome: Ongoing  Goal: Absence of falls  8/3/2021 0559 by Christiano Lockwood RN  Outcome: Ongoing  8/2/2021 1602 by eDnnys Schuler RN  Outcome: Ongoing Vaccine status unknown

## 2023-07-24 NOTE — ED PROVIDER NOTE - OBJECTIVE STATEMENT
43 year old female with no med hx presented to ED c/o toe pain. pt states b/l great toenails have been brittle for months, had not noticed the color until she removed her nail polish tonight. denies pain, fever/chills, numbness, tingling, swelling.

## 2023-07-24 NOTE — ED PROVIDER NOTE - PHYSICAL EXAMINATION
Physical Examination:   Gen: NAD, AOx3  Head: NCAT  HEENT: EOMI, oral mucosa moist, normal conjunctiva, neck supple  Lung: no respiratory distress  CV:  Normal perfusion  Abd: soft, NT ND  MSK: No edema, no visible deformities  Neuro: No focal neurologic deficits  Skin: No rash   Psych: normal affect  + b/l great toenail fungal infection, no swelling, redness, discharge. nails intact

## 2023-07-24 NOTE — ED PROVIDER NOTE - CARE PROVIDER_API CALL
Zaire Griffin  Podiatric Medicine and Surgery  [address] [place] [address]  Phone: [phone]  Fax: [phone]  Follow Up Time:

## 2023-07-24 NOTE — ED PROVIDER NOTE - NSFOLLOWUPINSTRUCTIONS_ED_ALL_ED_FT
Apply cream as directed   Follow up with podiatry within 1 week     Return if new or worsening symptoms

## 2024-05-13 ENCOUNTER — NON-APPOINTMENT (OUTPATIENT)
Age: 44
End: 2024-05-13

## 2024-05-13 ENCOUNTER — APPOINTMENT (OUTPATIENT)
Dept: OBGYN | Facility: CLINIC | Age: 44
End: 2024-05-13
Payer: MEDICAID

## 2024-05-13 VITALS
WEIGHT: 150 LBS | DIASTOLIC BLOOD PRESSURE: 60 MMHG | BODY MASS INDEX: 26.58 KG/M2 | HEIGHT: 63 IN | SYSTOLIC BLOOD PRESSURE: 114 MMHG

## 2024-05-13 DIAGNOSIS — Z98.51 TUBAL LIGATION STATUS: ICD-10-CM

## 2024-05-13 DIAGNOSIS — R92.30 DENSE BREASTS, UNSPECIFIED: ICD-10-CM

## 2024-05-13 DIAGNOSIS — Z12.4 ENCOUNTER FOR SCREENING FOR MALIGNANT NEOPLASM OF CERVIX: ICD-10-CM

## 2024-05-13 DIAGNOSIS — Z01.419 ENCOUNTER FOR GYNECOLOGICAL EXAMINATION (GENERAL) (ROUTINE) W/OUT ABNORMAL FINDINGS: ICD-10-CM

## 2024-05-13 DIAGNOSIS — Z12.31 ENCOUNTER FOR SCREENING MAMMOGRAM FOR MALIGNANT NEOPLASM OF BREAST: ICD-10-CM

## 2024-05-13 PROCEDURE — 99386 PREV VISIT NEW AGE 40-64: CPT

## 2024-05-13 PROCEDURE — 99459 PELVIC EXAMINATION: CPT

## 2024-05-15 PROBLEM — R92.30 DENSE BREASTS: Status: ACTIVE | Noted: 2024-05-15

## 2024-05-15 PROBLEM — Z01.419 ENCOUNTER FOR WELL WOMAN EXAM WITH ROUTINE GYNECOLOGICAL EXAM: Status: ACTIVE | Noted: 2024-05-15

## 2024-05-15 LAB — HPV HIGH+LOW RISK DNA PNL CVX: NOT DETECTED

## 2024-05-15 NOTE — PHYSICAL EXAM
[61053] : A chaperone was present during the pelvic exam. [Chaperone Present] : A chaperone was present in the examining room during all aspects of the physical examination [FreeTextEntry2] : SYLVIA Avilez  [Appropriately responsive] : appropriately responsive [Alert] : alert [No Acute Distress] : no acute distress [Soft] : soft [Non-tender] : non-tender [Non-distended] : non-distended [No Mass] : no mass [Oriented x3] : oriented x3 [FreeTextEntry6] : dense tissue bilaterally  [Examination Of The Breasts] : a normal appearance [No Masses] : no breast masses were palpable [Labia Majora] : normal [Labia Minora] : normal [No Bleeding] : There was no active vaginal bleeding [Normal] : normal [Tenderness] : nontender [Uterine Adnexae] : non-palpable

## 2024-05-15 NOTE — PLAN
[FreeTextEntry1] : -F/u pap -Referral provided for [facility], advised start PNV now  -Rx screening mammogram/BUS for dense areas of tissue on exam -Recommended dermatology for routine, annual skin survey -Routine physical activity encouraged -Call or RTO PRN for any new problems, questions or concerns

## 2024-05-15 NOTE — HISTORY OF PRESENT ILLNESS
[Tubal Occlusion] : has had a tubal occlusion [Y] : Positive pregnancy history [Menarche Age: ____] : age at menarche was [unfilled] [No] : Patient does not have concerns regarding sex [Currently Active] : currently active [LMPDate] : 5/10/24 [PGHxTotal] : 2 [[facility] : 2 [facility] : 2 [FreeTextEntry1] : 5/10/24

## 2024-05-18 LAB — CYTOLOGY CVX/VAG DOC THIN PREP: NORMAL

## 2024-06-14 DIAGNOSIS — R92.8 OTHER ABNORMAL AND INCONCLUSIVE FINDINGS ON DIAGNOSTIC IMAGING OF BREAST: ICD-10-CM

## 2024-06-24 ENCOUNTER — NON-APPOINTMENT (OUTPATIENT)
Age: 44
End: 2024-06-24

## 2024-06-27 ENCOUNTER — NON-APPOINTMENT (OUTPATIENT)
Age: 44
End: 2024-06-27

## 2024-11-01 ENCOUNTER — EMERGENCY (EMERGENCY)
Facility: HOSPITAL | Age: 44
LOS: 1 days | Discharge: HOME | End: 2024-11-01
Attending: EMERGENCY MEDICINE
Payer: COMMERCIAL

## 2024-11-01 VITALS
TEMPERATURE: 98 F | OXYGEN SATURATION: 98 % | HEART RATE: 83 BPM | RESPIRATION RATE: 18 BRPM | DIASTOLIC BLOOD PRESSURE: 116 MMHG | SYSTOLIC BLOOD PRESSURE: 148 MMHG | WEIGHT: 148.15 LBS

## 2024-11-01 PROCEDURE — 99283 EMERGENCY DEPT VISIT LOW MDM: CPT

## 2024-11-01 PROCEDURE — 99053 MED SERV 10PM-8AM 24 HR FAC: CPT

## 2024-11-01 PROCEDURE — 99284 EMERGENCY DEPT VISIT MOD MDM: CPT

## 2024-11-01 RX ORDER — IBUPROFEN 200 MG
600 TABLET ORAL ONCE
Refills: 0 | Status: COMPLETED | OUTPATIENT
Start: 2024-11-01 | End: 2024-11-01

## 2024-11-01 RX ORDER — METHOCARBAMOL 500 MG/1
1500 TABLET ORAL ONCE
Refills: 0 | Status: COMPLETED | OUTPATIENT
Start: 2024-11-01 | End: 2024-11-01

## 2024-11-01 RX ORDER — LIDOCAINE HYDROCHLORIDE 40 MG/ML
1 SOLUTION TOPICAL ONCE
Refills: 0 | Status: COMPLETED | OUTPATIENT
Start: 2024-11-01 | End: 2024-11-01

## 2024-11-01 RX ORDER — METHOCARBAMOL 500 MG/1
2 TABLET ORAL
Qty: 18 | Refills: 0
Start: 2024-11-01 | End: 2024-11-03

## 2024-11-01 RX ORDER — ACETAMINOPHEN 500 MG
650 TABLET ORAL ONCE
Refills: 0 | Status: COMPLETED | OUTPATIENT
Start: 2024-11-01 | End: 2024-11-01

## 2024-11-01 RX ORDER — LIDOCAINE HYDROCHLORIDE 40 MG/ML
1 SOLUTION TOPICAL ONCE
Refills: 0 | Status: ACTIVE | OUTPATIENT
Start: 2024-11-01 | End: 2024-11-01

## 2024-11-01 RX ORDER — IBUPROFEN 200 MG
1 TABLET ORAL
Qty: 21 | Refills: 0
Start: 2024-11-01 | End: 2024-11-07

## 2024-11-01 RX ADMIN — Medication 600 MILLIGRAM(S): at 03:56

## 2024-11-01 RX ADMIN — LIDOCAINE HYDROCHLORIDE 1 PATCH: 40 SOLUTION TOPICAL at 03:57

## 2024-11-01 RX ADMIN — METHOCARBAMOL 1500 MILLIGRAM(S): 500 TABLET ORAL at 03:57

## 2024-11-01 RX ADMIN — Medication 650 MILLIGRAM(S): at 03:57
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
## 2024-11-01 NOTE — ED ADULT TRIAGE NOTE - GLASGOW COMA SCALE: BEST VERBAL RESPONSE, MLM
DISCUSSED THE DX, IMAGES, PROGNOSIS AND TX PLAN. PT REQUIRES SX, RISK OF VI: MOD/HIGH. ALL QUESTIONS WERE ANSWERED. (V5) oriented
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
## 2024-11-01 NOTE — ED PROVIDER NOTE - ATTENDING APP SHARED VISIT CONTRIBUTION OF CARE
Pt is UTD can you please print certificate Thank you    I personally saw the patient with the PA, and completed the key components of the history and physical exam. I then discussed the management plan with the PA.   Gen: No acute distress, non toxic  HEENT: Mucous membranes moist, pink conjunctivae, EOMI  CV: RRR, nl s1/s2.  Resp: CTAB, normal rate and effort  GI: Abdomen soft, NT, ND. No rebound, no guarding  Neuro: A&O x 3, moving all 4 extremities  MSK: +b/l lumbar paraspinal TTP. No spine or joint tenderness to palpation  Skin: No rashes. intact and perfused.

## 2024-11-01 NOTE — ED PROVIDER NOTE - PATIENT PORTAL LINK FT
You can access the FollowMyHealth Patient Portal offered by [facility] by registering at the following website: [email or web address]. By joining [employer]’s FollowMyHealth portal, you will also be able to view your health information using other applications (apps) compatible with our system.
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
## 2024-11-01 NOTE — ED PROVIDER NOTE - PHYSICAL EXAMINATION
Gen: No acute distress, non toxic  HEENT: Mucous membranes moist, pink conjunctivae, EOMI  CV: RRR, nl s1/s2.  Resp: CTAB, normal rate and effort  GI: Abdomen soft, NT, ND. No rebound, no guarding  Neuro: A&O x 3, moving all 4 extremities  MSK: +b/l lumbar paraspinal TTP. No spine or joint tenderness to palpation  Skin: No rashes. intact and perfused.

## 2024-11-05 DIAGNOSIS — Z88.1 ALLERGY STATUS TO OTHER ANTIBIOTIC AGENTS: ICD-10-CM
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
## 2024-11-05 DIAGNOSIS — M54.50 LOW BACK PAIN, UNSPECIFIED: ICD-10-CM
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
## 2024-11-05 DIAGNOSIS — M25.561 PAIN IN RIGHT KNEE: ICD-10-CM

## 2024-11-05 DIAGNOSIS — Z88.8 ALLERGY STATUS TO OTHER DRUGS, MEDICAMENTS AND BIOLOGICAL SUBSTANCES: ICD-10-CM

## 2025-02-10 ENCOUNTER — NON-APPOINTMENT (OUTPATIENT)
Age: 45
End: 2025-02-10

## 2025-02-21 ENCOUNTER — NON-APPOINTMENT (OUTPATIENT)
Age: 45
End: 2025-02-21

## 2025-04-28 ENCOUNTER — NON-APPOINTMENT (OUTPATIENT)
Age: 45
End: 2025-04-28